# Patient Record
Sex: FEMALE | Race: WHITE
[De-identification: names, ages, dates, MRNs, and addresses within clinical notes are randomized per-mention and may not be internally consistent; named-entity substitution may affect disease eponyms.]

---

## 2019-12-04 ENCOUNTER — HOSPITAL ENCOUNTER (EMERGENCY)
Dept: HOSPITAL 9 - MADERS | Age: 49
Discharge: HOME | End: 2019-12-04
Payer: COMMERCIAL

## 2019-12-04 DIAGNOSIS — F41.9: ICD-10-CM

## 2019-12-04 DIAGNOSIS — J11.1: Primary | ICD-10-CM

## 2019-12-04 DIAGNOSIS — K74.60: ICD-10-CM

## 2019-12-04 LAB
ALBUMIN SERPL BCG-MCNC: 4.9 G/DL (ref 3.5–5)
ALP SERPL-CCNC: 90 U/L (ref 40–110)
ALT SERPL W P-5'-P-CCNC: 18 U/L (ref 8–55)
ANION GAP SERPL CALC-SCNC: 18 MMOL/L (ref 10–20)
AST SERPL-CCNC: 13 U/L (ref 5–34)
BASOPHILS # BLD AUTO: 0.1 THOU/UL (ref 0–0.2)
BASOPHILS NFR BLD AUTO: 1.2 % (ref 0–1)
BILIRUB SERPL-MCNC: 0.8 MG/DL (ref 0.2–1.2)
BUN SERPL-MCNC: 8 MG/DL (ref 7–18.7)
CALCIUM SERPL-MCNC: 10.1 MG/DL (ref 7.8–10.44)
CHLORIDE SERPL-SCNC: 105 MMOL/L (ref 98–107)
CK MB SERPL-MCNC: 0.4 NG/ML (ref 0–6.6)
CO2 SERPL-SCNC: 21 MMOL/L (ref 22–29)
CREAT CL PREDICTED SERPL C-G-VRATE: 0 ML/MIN (ref 70–130)
EOSINOPHIL # BLD AUTO: 0.1 THOU/UL (ref 0–0.7)
EOSINOPHIL NFR BLD AUTO: 1 % (ref 0–10)
GLOBULIN SER CALC-MCNC: 3 G/DL (ref 2.4–3.5)
GLUCOSE SERPL-MCNC: 89 MG/DL (ref 70–105)
HGB BLD-MCNC: 14.5 G/DL (ref 12–16)
LYMPHOCYTES # BLD AUTO: 3.8 THOU/UL (ref 1.2–3.4)
LYMPHOCYTES NFR BLD AUTO: 37.5 % (ref 21–51)
MCH RBC QN AUTO: 31.3 PG (ref 27–31)
MCV RBC AUTO: 97.6 FL (ref 78–98)
MONOCYTES # BLD AUTO: 0.5 THOU/UL (ref 0.11–0.59)
MONOCYTES NFR BLD AUTO: 4.6 % (ref 0–10)
NEUTROPHILS # BLD AUTO: 5.7 THOU/UL (ref 1.4–6.5)
NEUTROPHILS NFR BLD AUTO: 55.7 % (ref 42–75)
PLATELET # BLD AUTO: 298 THOU/UL (ref 130–400)
POTASSIUM SERPL-SCNC: 3.5 MMOL/L (ref 3.5–5.1)
RBC # BLD AUTO: 4.63 MILL/UL (ref 4.2–5.4)
SODIUM SERPL-SCNC: 140 MMOL/L (ref 136–145)
TROPONIN I SERPL DL<=0.01 NG/ML-MCNC: (no result) NG/ML (ref ?–0.03)
WBC # BLD AUTO: 10.2 THOU/UL (ref 4.8–10.8)

## 2019-12-04 PROCEDURE — 87804 INFLUENZA ASSAY W/OPTIC: CPT

## 2019-12-04 PROCEDURE — 87081 CULTURE SCREEN ONLY: CPT

## 2019-12-04 PROCEDURE — 71046 X-RAY EXAM CHEST 2 VIEWS: CPT

## 2019-12-04 PROCEDURE — 96375 TX/PRO/DX INJ NEW DRUG ADDON: CPT

## 2019-12-04 PROCEDURE — 85025 COMPLETE CBC W/AUTO DIFF WBC: CPT

## 2019-12-04 PROCEDURE — 84484 ASSAY OF TROPONIN QUANT: CPT

## 2019-12-04 PROCEDURE — 87430 STREP A AG IA: CPT

## 2019-12-04 PROCEDURE — 82553 CREATINE MB FRACTION: CPT

## 2019-12-04 PROCEDURE — 96374 THER/PROPH/DIAG INJ IV PUSH: CPT

## 2019-12-04 PROCEDURE — 80053 COMPREHEN METABOLIC PANEL: CPT

## 2019-12-04 NOTE — RAD
PA AND LATERAL CHEST:

 

Date:  12/04/19 

 

HISTORY:  

Chest pain x2 days. 

 

FINDINGS:

Heart size and mediastinum are within normal limits. Lungs are clear of infiltrates. No significant b
kyrie findings. 

 

IMPRESSION: 

No active intrathoracic disease. 

 

 

POS: [facility]

## 2020-12-08 ENCOUNTER — HOSPITAL ENCOUNTER (EMERGENCY)
Dept: HOSPITAL 9 - MADERS | Age: 50
Discharge: HOME | End: 2020-12-08
Payer: COMMERCIAL

## 2020-12-08 DIAGNOSIS — M48.00: ICD-10-CM

## 2020-12-08 DIAGNOSIS — Z86.73: ICD-10-CM

## 2020-12-08 DIAGNOSIS — M35.00: ICD-10-CM

## 2020-12-08 DIAGNOSIS — M54.5: Primary | ICD-10-CM

## 2020-12-08 DIAGNOSIS — G62.9: ICD-10-CM

## 2020-12-08 DIAGNOSIS — F17.210: ICD-10-CM

## 2020-12-08 DIAGNOSIS — R11.2: ICD-10-CM

## 2020-12-08 DIAGNOSIS — K50.90: ICD-10-CM

## 2020-12-08 DIAGNOSIS — Z79.899: ICD-10-CM

## 2020-12-08 LAB — SP GR UR STRIP: 1.02 (ref 1–1.03)

## 2020-12-08 PROCEDURE — 74176 CT ABD & PELVIS W/O CONTRAST: CPT

## 2020-12-08 PROCEDURE — 81003 URINALYSIS AUTO W/O SCOPE: CPT

## 2020-12-08 PROCEDURE — 96372 THER/PROPH/DIAG INJ SC/IM: CPT

## 2020-12-08 PROCEDURE — 71046 X-RAY EXAM CHEST 2 VIEWS: CPT

## 2020-12-08 NOTE — CT
CT ABDOMEN AND PELVIS PERFORMED WITHOUT CONTRAST ENHANCEMENT:

 

Date:  12/08/2020

 

HISTORY:  

Right CVA pain with radiation. 

 

FINDINGS:

The lung bases are clear. 

 

The liver, spleen, pancreas, and gallbladder regions appear unremarkable given the limitations of a n
oncontrast study. 

 

Right and left adrenal glands, and right and left kidneys are normal in size. No renal calculi. No ob
struction. No significant periaortic or mesenteric adenopathy. The appendix is normal in size. It is 
more retrocecal in location. 

 

CT of pelvis was performed without contrast enhancement. No adenopathy, mass, or free fluid. 

 

No significant bony findings. 

 

IMPRESSION: 

1.  Normal appendix. 

2.  No renal or ureteral calculi. 

 

 

POS: [facility]

## 2020-12-08 NOTE — RAD
CHEST 2 VIEWS:

 

Date:  12/08/2020

 

HISTORY:  

Chest pain, recent flu. 

 

FINDINGS:

Heart size is within normal limits. The lungs are clear of acute process. The lateral aspect of the r
ight chest wall is not completely included. No confluent pneumonia, overt edema, or pleural effusion.
 

 

IMPRESSION: 

No significant acute intrathoracic disease. 

 

 

POS: [facility]